# Patient Record
Sex: FEMALE | Race: WHITE | NOT HISPANIC OR LATINO | Employment: OTHER | URBAN - METROPOLITAN AREA
[De-identification: names, ages, dates, MRNs, and addresses within clinical notes are randomized per-mention and may not be internally consistent; named-entity substitution may affect disease eponyms.]

---

## 2017-11-21 ENCOUNTER — HOSPITAL ENCOUNTER (EMERGENCY)
Facility: MEDICAL CENTER | Age: 82
End: 2017-11-21
Attending: EMERGENCY MEDICINE
Payer: MEDICARE

## 2017-11-21 VITALS
TEMPERATURE: 98.7 F | HEART RATE: 93 BPM | OXYGEN SATURATION: 95 % | RESPIRATION RATE: 18 BRPM | WEIGHT: 111.77 LBS | DIASTOLIC BLOOD PRESSURE: 88 MMHG | SYSTOLIC BLOOD PRESSURE: 174 MMHG

## 2017-11-21 DIAGNOSIS — H10.9 CONJUNCTIVITIS OF RIGHT EYE, UNSPECIFIED CONJUNCTIVITIS TYPE: ICD-10-CM

## 2017-11-21 PROCEDURE — 99284 EMERGENCY DEPT VISIT MOD MDM: CPT

## 2017-11-21 ASSESSMENT — LIFESTYLE VARIABLES: DO YOU DRINK ALCOHOL: NO

## 2017-11-21 ASSESSMENT — ENCOUNTER SYMPTOMS
EYE REDNESS: 1
BLURRED VISION: 0
EYE PAIN: 1
HEADACHES: 0
EYE DISCHARGE: 1

## 2017-11-21 NOTE — ED PROVIDER NOTES
ED Provider Note    Scribed for Bonilla Ellison M.D. by Jessenia Heard. 11/21/2017, 2:58 PM.    Primary care provider: None reported.  Means of arrival: Walk-in   History obtained from: Patient  History limited by: None    CHIEF COMPLAINT  Chief Complaint   Patient presents with   • Eye Drainage     right eye swollen and draining.      HPI  Agatha Zuñiga is a 82 y.o. female who presents to the Emergency Department for evaluation of right eye pain and swelling. Family member reports chronic right eye lid swelling for the past month with associated drainage.She states patient was seen by an optometrist and completed two rounds of antibiotics over the past month with no relief of swelling or discharge. Eye pain has been worsening since onset and patient can not closed the eye lid entirely. She states patient was prescribed an ointment that caused the eye lid to bleed onset three days ago. Patient denies loss of vision or headache. Her ocular pressure was evaluated and was normal one month ago.  Patient does not have further medical complaints.     REVIEW OF SYSTEMS  Review of Systems   Eyes: Positive for pain, discharge and redness. Negative for blurred vision.   Neurological: Negative for headaches.   E    PAST MEDICAL HISTORY   Patient does not report pertinent medical history.     SURGICAL HISTORY  patient denies any surgical history    SOCIAL HISTORY  None reported     FAMILY HISTORY  None reported     CURRENT MEDICATIONS  No current facility-administered medications on file prior to encounter.      No current outpatient prescriptions on file prior to encounter.     ALLERGIES  No Known Allergies    PHYSICAL EXAM  VITAL SIGNS: BP (!) 170/78   Pulse 91   Temp 37.4 °C (99.3 °F)   Resp 18   Wt 50.7 kg (111 lb 12.4 oz)   SpO2 92%     Constitutional:  No acute distress  HENT:  Moist mucous membranes  Eyes: right cornea is clear, diffuse conjunctiva ejection with drainage of lower lid.   Cardiovascular:  Regular rate and rhythm, no murmurs  Thorax & Lungs: Normal breath sounds, no rhonchi  Skin:. no rash  Extremities:   no edema  Vascular: symmetric radial pulse  Neurologic:  Normal gross motor    COURSE & MEDICAL DECISION MAKING  Pertinent Labs & Imaging studies reviewed. (See chart for details)    2:58 PM - Patient seen and examined at bedside. Visual acuity test will be completed at this time. Paged opthalmology for further evaluation.     3:32 PM- I discussed the patient's case and the above findings with Dr. Kay (opthalmology) who agrees to see patient as a follow up.     4:16 PM - Recheck: Patient is resting comfortably. I updated her that opthalmology will see patient has follow up. I explained that she is now stable for discharge. She understands and will comply.   FINAL IMPRESSION  1. Conjunctivitis of right eye, unspecified conjunctivitis type      PRESCRIPTIONS  There are no discharge medications for this patient.    FOLLOW UP  Rosamaria Tate M.D.  [address] [place] [address] [address] [place] [address]  [phone]            -DISCHARGE-     Jessenia LEE (Scribe), am scribing for, and in the presence of, Bonilla Ellison M.D..    Electronically signed by: Jessenia Heard (Scribe), 11/21/2017    IBonilla M.D. personally performed the services described in this documentation, as scribed by Jessenia Heard in my presence, and it is both accurate and complete.    The note accurately reflects work and decisions made by me.  Bonilla Ellison  11/21/2017  9:32 PM

## 2017-11-21 NOTE — ED NOTES
Chief Complaint   Patient presents with   • Eye Drainage     right eye swollen and draining.      Pt from [place], sent by eye doctor.  Blood pressure (!) 170/78, pulse 91, temperature 37.4 °C (99.3 °F), resp. rate 18, weight 50.7 kg (111 lb 12.4 oz), SpO2 92 %.    Pt informed of wait times. Educated on triage process.  Asked to return to triage RN for any new or worsening of symptoms. Thanked for patience.

## 2017-11-21 NOTE — ED NOTES
Pt family member upset about wait time and stating that they are going to have to leave if they don't get to come back soon.  Pt family member educated on ER process and differences in fast track vs main ER.  Apologized for wait.  Charge RN informed aware of pt and working on room for pt.  Pt and family member updated on plan.

## 2017-11-22 NOTE — ED NOTES
All DC discussed with patient and family for eye infection. She has been reminded the importance of washing hands. She will return for loss or changes in vision. She will follow up with up with eye doctor at 10 am tomorrow morning. Pt verbalized understanding of all DC instructions, prescriptions and follow up recommendations. Pt ambulated to lobby with upright and steady gait.

## 2017-11-22 NOTE — DISCHARGE INSTRUCTIONS
"Conjunctivitis    See Dr GREEN tomorrow at 10 am  Conjunctivitis is commonly called \"pink eye.\" Conjunctivitis can be caused by bacterial or viral infection, allergies, or injuries. There is usually redness of the lining of the eye, itching, discomfort, and sometimes discharge. There may be deposits of matter along the eyelids. A viral infection usually causes a watery discharge, while a bacterial infection causes a yellowish, thick discharge. Pink eye is very contagious and spreads by direct contact.  You may be given antibiotic eyedrops as part of your treatment. Before using your eye medicine, remove all drainage from the eye by washing gently with warm water and cotton balls. Continue to use the medication until you have awakened 2 mornings in a row without discharge from the eye. Do not rub your eye. This increases the irritation and helps spread infection. Use separate towels from other household members. Wash your hands with soap and water before and after touching your eyes. Use cold compresses to reduce pain and sunglasses to relieve irritation from light. Do not wear contact lenses or wear eye makeup until the infection is gone.  SEEK MEDICAL CARE IF:   How to Use Eye Drops and Eye Ointments  HOW TO APPLY EYE DROPS  Follow these steps when applying eye drops:  1. Wash your hands.  2. Tilt your head back.  3. Put a finger under your eye and use it to gently pull your lower lid downward. Keep that finger in place.  4. Using your other hand, hold the dropper between your thumb and index finger.  5. Position the dropper just over the edge of the lower lid. Hold it as close to your eye as you can without touching the dropper to your eye.  6. Steady your hand. One way to do this is to lean your index finger against your brow.  7. Look up.  8. Slowly and gently squeeze one drop of medicine into your eye.  9. Close your eye.  10. Place a finger between your lower eyelid and your nose. Press gently for 2 minutes. This " increases the amount of time that the medicine is exposed to the eye. It also reduces side effects that can develop if the drop gets into the bloodstream through the nose.  HOW TO APPLY EYE OINTMENTS  Follow these steps when applying eye ointments:  1. Wash your hands.  2. Put a finger under your eye and use it to gently pull your lower lid downward. Keep that finger in place.  3. Using your other hand, place the tip of the tube between your thumb and index finger with the remaining fingers braced against your cheek or nose.  4. Hold the tube just over the edge of your lower lid without touching the tube to your lid or eyeball.  5. Look up.  6. Line the inner part of your lower lid with ointment.  7. Gently pull up on your upper lid and look down. This will force the ointment to spread over the surface of the eye.  8. Release the upper lid.  9. If you can, close your eyes for 1-2 minutes.  Do not rub your eyes. If you applied the ointment correctly, your vision will be blurry for a few minutes. This is normal.  ADDITIONAL INFORMATION  · Make sure to use the eye drops or ointment as told by your health care provider.  · If you have been told to use both eye drops and an eye ointment, apply the eye drops first, then wait 3-4 minutes before you apply the ointment.  · Try not to touch the tip of the dropper or tube to your eye. A dropper or tube that has touched the eye can become contaminated.     This information is not intended to replace advice given to you by your health care provider. Make sure you discuss any questions you have with your health care provider.     Document Released: 03/26/2002 Document Revised: 05/03/2016 Document Reviewed: 12/14/2015  [employer] Patient Education ©2016 [employer]    · Your symptoms are not better after 3 days of treatment.  · You have increased pain or trouble seeing.  · The outer eyelids become very red or swollen.  Document Released: 01/25/2006 Document Revised:  03/11/2013 Document Reviewed: 12/18/2006  [employer]® Patient Information ©2014 [employer]

## 2017-12-08 ENCOUNTER — APPOINTMENT (OUTPATIENT)
Dept: NEUROLOGY | Facility: MEDICAL CENTER | Age: 82
End: 2017-12-08
Payer: MEDICARE

## 2018-01-08 ENCOUNTER — OFFICE VISIT (OUTPATIENT)
Dept: NEUROLOGY | Facility: MEDICAL CENTER | Age: 83
End: 2018-01-08
Payer: MEDICARE

## 2018-01-08 VITALS
DIASTOLIC BLOOD PRESSURE: 90 MMHG | BODY MASS INDEX: 20.67 KG/M2 | WEIGHT: 109.5 LBS | OXYGEN SATURATION: 95 % | TEMPERATURE: 98.8 F | SYSTOLIC BLOOD PRESSURE: 158 MMHG | HEIGHT: 61 IN | HEART RATE: 92 BPM

## 2018-01-08 DIAGNOSIS — G20.C PRIMARY PARKINSONISM: ICD-10-CM

## 2018-01-08 PROCEDURE — 99204 OFFICE O/P NEW MOD 45 MIN: CPT | Performed by: PHYSICIAN ASSISTANT

## 2018-01-08 RX ORDER — CARBIDOPA AND LEVODOPA 25; 100 MG/1; MG/1
1 TABLET, EXTENDED RELEASE ORAL 2 TIMES DAILY
Qty: 60 TAB | Refills: 3 | Status: SHIPPED | OUTPATIENT
Start: 2018-01-08

## 2018-01-08 ASSESSMENT — PATIENT HEALTH QUESTIONNAIRE - PHQ9: CLINICAL INTERPRETATION OF PHQ2 SCORE: 0

## 2018-01-08 NOTE — PROGRESS NOTES
Subjective:      Agatha Zuñiga is a 82 y.o. female who presents with New Patient (tremors possible PD)    Patient here for shaking in her left hand.  It appears at rest and has been lasting for 5-6 years.  She also feels stiffness in the left hand also.  Left leg might occasionally shake too but this seems to be disputed recently.    Tremor is not bothersome to patient - she is just here because her PCP wanted to rule out PD.    Falling:  Denies  Memory:  No problems  Urinary incontinence:  She takes a bladder pill for this but they can't remember what it is  Constipation:  No problem right now but she takes an OTC metamucil and stool softener.  Constipation problems began years before the hand tremor started.  She describes issues with either stool leaking out around a blockage or possibly something similar to IBS with diarrhea and constipation alternating  Hallucinations:  Denies them all    PMH:  Scoliosis - severe with impairment of gait and function; colon cancer, peptic ulcer, arthritis    Medications:  Tylenol daily    Allergies:  none    Present today with her daughter, who she lives with in [place].    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  Denies ever drinking alcohol, using illicit drugs, or smoking.                    HPI    ROS       Objective:     There were no vitals taken for this visit.     Physical Exam    Well developed, well nourished - frail  Alert and Oriented x 3, right handed female, Affect Appropriate, Fund of knowledge within normal limits, Memory intact  Cranial Nerves:  EOMI without nystagmus or opthalmoplegia, right lower lid exotropia, face symmetric in strength and sensation, no facial droop, tongue midline without atrophy or fasiculations, shoulder shrug is normal bilaterally, speech clear and fluent no aphasia  Motor:  Generalized weakness and difficult to assess right arm strength due to scoliosis which affects her right arm and leg.    Sensation:  Light touch equal  in all extremities,   Tone:  Slight cogwheeling left arm, none appreciated in the right leg  Cerebellar:  Finger to nose intact.  No dysmetria.  Resting tremor right hand.  Gait: abnormal gait - severely restricted in ability to evaluate as patient scoliosis is severe.  She cannot walk without assistance but it appears that left arm has no armswing  Rapidly alternating movements UE:  Significantly reduced in left arm compared to right  Pull test not attempted as patient balance is so poor likely due to severe curvature of spine that I did not attempt  Handwriting test - normal with no decreased amplitude          Assessment/Plan:     Parkinson's disease:  Suspect PD.  Although patient is of advanced age and is frail, we decided to try sinemet 25/100 ER BID x 30 days and monitor response to see if patient will have better balance and therefore be at less risk of falls.  Pt and daughter warned of possible S/E of medication.  They will call our office and advise of progress around 2/1 or 2/2.    Total time with this visit: 45    Minutes face-to-face with patient. More than 50% of this visit was spent educating patient on their illness and/or coordinating care, as detailed above

## 2018-01-09 RX ORDER — OXYBUTYNIN CHLORIDE 5 MG/1
5 TABLET, EXTENDED RELEASE ORAL DAILY
COMMUNITY

## 2018-05-11 ENCOUNTER — TELEPHONE (OUTPATIENT)
Dept: NEUROLOGY | Facility: MEDICAL CENTER | Age: 83
End: 2018-05-11

## 2018-05-11 NOTE — TELEPHONE ENCOUNTER
Called in   carbidopa-levodopa CR (SINEMET CR)  MG per tablet 60 Tab 11 5/11/18     Sig - Route: Take 1 Tab by mouth 2 Times a Day. - Oral      To:  [facility] - [place], [place] [address] [facility] [phone] (Phone)  [phone] (Fax)

## 2018-05-16 ENCOUNTER — TELEPHONE (OUTPATIENT)
Dept: NEUROLOGY | Facility: MEDICAL CENTER | Age: 83
End: 2018-05-16

## 2018-05-16 NOTE — TELEPHONE ENCOUNTER
Called in Carbidopa-Levodopa  mg #60 3 refills to :  [id number] - [place] [place] [facility] [address] [facility]: [phone]